# Patient Record
Sex: MALE
[De-identification: names, ages, dates, MRNs, and addresses within clinical notes are randomized per-mention and may not be internally consistent; named-entity substitution may affect disease eponyms.]

---

## 2020-07-08 ENCOUNTER — HOSPITAL ENCOUNTER (INPATIENT)
Dept: HOSPITAL 90 - EDH | Age: 60
LOS: 5 days | Discharge: NURSING, REHAB OR LONG-TERM CARE | DRG: 717 | End: 2020-07-13
Attending: INTERNAL MEDICINE | Admitting: INTERNAL MEDICINE
Payer: COMMERCIAL

## 2020-07-08 VITALS — BODY MASS INDEX: 26.22 KG/M2 | HEIGHT: 63 IN | WEIGHT: 148 LBS

## 2020-07-08 DIAGNOSIS — Z20.828: ICD-10-CM

## 2020-07-08 DIAGNOSIS — E11.9: ICD-10-CM

## 2020-07-08 DIAGNOSIS — I10: ICD-10-CM

## 2020-07-08 DIAGNOSIS — K21.9: ICD-10-CM

## 2020-07-08 DIAGNOSIS — B95.62: ICD-10-CM

## 2020-07-08 DIAGNOSIS — N49.2: Primary | ICD-10-CM

## 2020-07-08 DIAGNOSIS — F41.9: ICD-10-CM

## 2020-07-08 DIAGNOSIS — N39.0: ICD-10-CM

## 2020-07-08 DIAGNOSIS — F17.200: ICD-10-CM

## 2020-07-08 DIAGNOSIS — E78.5: ICD-10-CM

## 2020-07-08 RX ADMIN — VANCOMYCIN HYDROCHLORIDE SCH MLS/HR: 1 INJECTION, POWDER, LYOPHILIZED, FOR SOLUTION INTRAVENOUS at 23:00

## 2020-07-09 VITALS
DIASTOLIC BLOOD PRESSURE: 81 MMHG | RESPIRATION RATE: 18 BRPM | HEART RATE: 63 BPM | SYSTOLIC BLOOD PRESSURE: 114 MMHG | TEMPERATURE: 97.7 F

## 2020-07-09 VITALS
HEART RATE: 63 BPM | TEMPERATURE: 98.3 F | SYSTOLIC BLOOD PRESSURE: 104 MMHG | DIASTOLIC BLOOD PRESSURE: 67 MMHG | RESPIRATION RATE: 20 BRPM

## 2020-07-09 VITALS
HEART RATE: 62 BPM | TEMPERATURE: 98.1 F | SYSTOLIC BLOOD PRESSURE: 125 MMHG | RESPIRATION RATE: 20 BRPM | DIASTOLIC BLOOD PRESSURE: 73 MMHG

## 2020-07-09 VITALS
HEART RATE: 62 BPM | TEMPERATURE: 98 F | RESPIRATION RATE: 20 BRPM | SYSTOLIC BLOOD PRESSURE: 129 MMHG | DIASTOLIC BLOOD PRESSURE: 71 MMHG

## 2020-07-09 RX ADMIN — FAMOTIDINE SCH MG: 20 TABLET, FILM COATED ORAL at 20:57

## 2020-07-09 RX ADMIN — VANCOMYCIN HYDROCHLORIDE SCH MLS/HR: 1 INJECTION, POWDER, LYOPHILIZED, FOR SOLUTION INTRAVENOUS at 09:00

## 2020-07-09 RX ADMIN — SODIUM CHLORIDE SCH UNIT: 9 INJECTION, SOLUTION INTRAVENOUS at 11:30

## 2020-07-09 RX ADMIN — SODIUM CHLORIDE SCH UNIT: 9 INJECTION, SOLUTION INTRAVENOUS at 20:52

## 2020-07-09 RX ADMIN — SODIUM CHLORIDE SCH MLS/HR: 0.9 INJECTION, SOLUTION INTRAVENOUS at 16:15

## 2020-07-09 RX ADMIN — SODIUM CHLORIDE SCH UNIT: 9 INJECTION, SOLUTION INTRAVENOUS at 16:30

## 2020-07-09 RX ADMIN — FAMOTIDINE SCH MG: 20 TABLET, FILM COATED ORAL at 09:00

## 2020-07-09 RX ADMIN — SODIUM CHLORIDE SCH UNIT: 9 INJECTION, SOLUTION INTRAVENOUS at 07:30

## 2020-07-09 RX ADMIN — PIPERACILLIN SODIUM AND TAZOBACTAM SODIUM SCH MLS/HR: .375; 3 INJECTION, POWDER, LYOPHILIZED, FOR SOLUTION INTRAVENOUS at 20:59

## 2020-07-09 RX ADMIN — PIPERACILLIN SODIUM AND TAZOBACTAM SODIUM SCH MLS/HR: .375; 3 INJECTION, POWDER, LYOPHILIZED, FOR SOLUTION INTRAVENOUS at 05:00

## 2020-07-09 RX ADMIN — ENOXAPARIN SODIUM SCH MG: 40 INJECTION SUBCUTANEOUS at 09:00

## 2020-07-09 RX ADMIN — SODIUM CHLORIDE SCH MLS/HR: 0.9 INJECTION, SOLUTION INTRAVENOUS at 20:58

## 2020-07-09 RX ADMIN — VANCOMYCIN HYDROCHLORIDE SCH MLS/HR: 1 INJECTION, POWDER, LYOPHILIZED, FOR SOLUTION INTRAVENOUS at 20:53

## 2020-07-09 RX ADMIN — PIPERACILLIN SODIUM AND TAZOBACTAM SODIUM SCH MLS/HR: .375; 3 INJECTION, POWDER, LYOPHILIZED, FOR SOLUTION INTRAVENOUS at 13:00

## 2020-07-09 NOTE — NUR
SPOKE WITH SPOUSE ON PHONE- CALL TO PATIENT , 

NO ANSWER, CALL TO  SPOUSE ROBERT EID  LISTED ON FACE SHEET



STATES PATIENT LIVES WITH HER-  MANY YEARS AGO BUT NOW THEY LIVES TOGETHER, PT USES 
NO DME, HAS NOT PROVIDER OR HOME HEALTH, PATIENT IS FORGETFUL AND REQUIRES A BIT OF 
WATCHING, 



 DCP IS TO HOME, [facility] TO PROVIDE TRANSPORT , CM TO FOLLOW 

-------------------------------------------------------------------------------

Addendum: 07/09/20 at 1506 by FER TAYLOR RN CM

-------------------------------------------------------------------------------

Amended: Links added.

## 2020-07-09 NOTE — NUR
I spoke to Hunter FNP regarding patient's telemetry informed him patient has been NSR 
with BBB, no change since admission on the 6th. Orders received to go ahead and discontinue 
it. Patient made aware, verbalized understanding.

## 2020-07-09 NOTE — NUR
DR. JUAREZ  HERE , AND SPOKE WITH PT.OF  THE SURGERY FOR TOMMORROW. RISK  , EXPLAIN, AND  
ORDERS  TO FOLLOW

## 2020-07-10 VITALS
SYSTOLIC BLOOD PRESSURE: 115 MMHG | RESPIRATION RATE: 19 BRPM | DIASTOLIC BLOOD PRESSURE: 73 MMHG | HEART RATE: 61 BPM | TEMPERATURE: 97.4 F

## 2020-07-10 VITALS
DIASTOLIC BLOOD PRESSURE: 81 MMHG | SYSTOLIC BLOOD PRESSURE: 137 MMHG | TEMPERATURE: 98 F | HEART RATE: 64 BPM | RESPIRATION RATE: 16 BRPM

## 2020-07-10 VITALS
HEART RATE: 69 BPM | DIASTOLIC BLOOD PRESSURE: 70 MMHG | TEMPERATURE: 98 F | SYSTOLIC BLOOD PRESSURE: 110 MMHG | RESPIRATION RATE: 15 BRPM

## 2020-07-10 VITALS
DIASTOLIC BLOOD PRESSURE: 50 MMHG | RESPIRATION RATE: 18 BRPM | SYSTOLIC BLOOD PRESSURE: 100 MMHG | HEART RATE: 65 BPM | TEMPERATURE: 97.9 F

## 2020-07-10 VITALS
RESPIRATION RATE: 18 BRPM | TEMPERATURE: 97.4 F | SYSTOLIC BLOOD PRESSURE: 129 MMHG | DIASTOLIC BLOOD PRESSURE: 66 MMHG | HEART RATE: 62 BPM

## 2020-07-10 VITALS
TEMPERATURE: 97.4 F | DIASTOLIC BLOOD PRESSURE: 78 MMHG | SYSTOLIC BLOOD PRESSURE: 119 MMHG | RESPIRATION RATE: 18 BRPM | HEART RATE: 64 BPM

## 2020-07-10 VITALS
SYSTOLIC BLOOD PRESSURE: 125 MMHG | DIASTOLIC BLOOD PRESSURE: 75 MMHG | HEART RATE: 65 BPM | RESPIRATION RATE: 18 BRPM | TEMPERATURE: 97.4 F

## 2020-07-10 VITALS
DIASTOLIC BLOOD PRESSURE: 75 MMHG | RESPIRATION RATE: 16 BRPM | TEMPERATURE: 98 F | SYSTOLIC BLOOD PRESSURE: 125 MMHG | HEART RATE: 66 BPM

## 2020-07-10 VITALS
HEART RATE: 59 BPM | RESPIRATION RATE: 16 BRPM | DIASTOLIC BLOOD PRESSURE: 78 MMHG | TEMPERATURE: 98 F | SYSTOLIC BLOOD PRESSURE: 116 MMHG

## 2020-07-10 VITALS
SYSTOLIC BLOOD PRESSURE: 100 MMHG | RESPIRATION RATE: 20 BRPM | DIASTOLIC BLOOD PRESSURE: 64 MMHG | TEMPERATURE: 98.1 F | HEART RATE: 60 BPM

## 2020-07-10 VITALS
HEART RATE: 56 BPM | DIASTOLIC BLOOD PRESSURE: 75 MMHG | TEMPERATURE: 97.9 F | SYSTOLIC BLOOD PRESSURE: 117 MMHG | RESPIRATION RATE: 18 BRPM

## 2020-07-10 VITALS
SYSTOLIC BLOOD PRESSURE: 90 MMHG | DIASTOLIC BLOOD PRESSURE: 34 MMHG | HEART RATE: 98 BPM | TEMPERATURE: 98 F | RESPIRATION RATE: 17 BRPM

## 2020-07-10 VITALS — RESPIRATION RATE: 16 BRPM | HEART RATE: 73 BPM | SYSTOLIC BLOOD PRESSURE: 106 MMHG | DIASTOLIC BLOOD PRESSURE: 50 MMHG

## 2020-07-10 VITALS
DIASTOLIC BLOOD PRESSURE: 66 MMHG | HEART RATE: 68 BPM | RESPIRATION RATE: 16 BRPM | TEMPERATURE: 98 F | SYSTOLIC BLOOD PRESSURE: 101 MMHG

## 2020-07-10 VITALS
DIASTOLIC BLOOD PRESSURE: 87 MMHG | SYSTOLIC BLOOD PRESSURE: 127 MMHG | RESPIRATION RATE: 18 BRPM | HEART RATE: 58 BPM | TEMPERATURE: 97.4 F

## 2020-07-10 VITALS
SYSTOLIC BLOOD PRESSURE: 111 MMHG | DIASTOLIC BLOOD PRESSURE: 68 MMHG | TEMPERATURE: 97.8 F | RESPIRATION RATE: 16 BRPM | HEART RATE: 60 BPM

## 2020-07-10 VITALS
SYSTOLIC BLOOD PRESSURE: 118 MMHG | DIASTOLIC BLOOD PRESSURE: 90 MMHG | RESPIRATION RATE: 18 BRPM | TEMPERATURE: 98 F | HEART RATE: 66 BPM

## 2020-07-10 VITALS
HEART RATE: 64 BPM | TEMPERATURE: 98 F | SYSTOLIC BLOOD PRESSURE: 105 MMHG | DIASTOLIC BLOOD PRESSURE: 66 MMHG | RESPIRATION RATE: 12 BRPM

## 2020-07-10 VITALS
HEART RATE: 66 BPM | TEMPERATURE: 97.4 F | RESPIRATION RATE: 18 BRPM | SYSTOLIC BLOOD PRESSURE: 132 MMHG | DIASTOLIC BLOOD PRESSURE: 46 MMHG

## 2020-07-10 VITALS — SYSTOLIC BLOOD PRESSURE: 96 MMHG | RESPIRATION RATE: 18 BRPM | HEART RATE: 71 BPM | DIASTOLIC BLOOD PRESSURE: 45 MMHG

## 2020-07-10 VITALS
SYSTOLIC BLOOD PRESSURE: 102 MMHG | TEMPERATURE: 98.2 F | RESPIRATION RATE: 18 BRPM | DIASTOLIC BLOOD PRESSURE: 64 MMHG | HEART RATE: 63 BPM

## 2020-07-10 VITALS
TEMPERATURE: 97.4 F | HEART RATE: 61 BPM | SYSTOLIC BLOOD PRESSURE: 115 MMHG | RESPIRATION RATE: 18 BRPM | DIASTOLIC BLOOD PRESSURE: 73 MMHG

## 2020-07-10 VITALS — RESPIRATION RATE: 16 BRPM | DIASTOLIC BLOOD PRESSURE: 44 MMHG | SYSTOLIC BLOOD PRESSURE: 106 MMHG | HEART RATE: 65 BPM

## 2020-07-10 VITALS
DIASTOLIC BLOOD PRESSURE: 69 MMHG | RESPIRATION RATE: 16 BRPM | HEART RATE: 61 BPM | SYSTOLIC BLOOD PRESSURE: 109 MMHG | TEMPERATURE: 98 F

## 2020-07-10 VITALS
HEART RATE: 66 BPM | DIASTOLIC BLOOD PRESSURE: 88 MMHG | TEMPERATURE: 98 F | SYSTOLIC BLOOD PRESSURE: 136 MMHG | RESPIRATION RATE: 16 BRPM

## 2020-07-10 VITALS — SYSTOLIC BLOOD PRESSURE: 95 MMHG | RESPIRATION RATE: 15 BRPM | DIASTOLIC BLOOD PRESSURE: 51 MMHG | HEART RATE: 82 BPM

## 2020-07-10 VITALS
SYSTOLIC BLOOD PRESSURE: 105 MMHG | DIASTOLIC BLOOD PRESSURE: 67 MMHG | HEART RATE: 60 BPM | TEMPERATURE: 98 F | RESPIRATION RATE: 15 BRPM

## 2020-07-10 VITALS
SYSTOLIC BLOOD PRESSURE: 116 MMHG | TEMPERATURE: 97.4 F | DIASTOLIC BLOOD PRESSURE: 82 MMHG | HEART RATE: 58 BPM | RESPIRATION RATE: 18 BRPM

## 2020-07-10 VITALS — SYSTOLIC BLOOD PRESSURE: 99 MMHG | DIASTOLIC BLOOD PRESSURE: 48 MMHG | HEART RATE: 63 BPM | RESPIRATION RATE: 16 BRPM

## 2020-07-10 VITALS
RESPIRATION RATE: 18 BRPM | HEART RATE: 56 BPM | TEMPERATURE: 98.9 F | SYSTOLIC BLOOD PRESSURE: 104 MMHG | DIASTOLIC BLOOD PRESSURE: 59 MMHG

## 2020-07-10 VITALS — HEART RATE: 83 BPM | DIASTOLIC BLOOD PRESSURE: 42 MMHG | RESPIRATION RATE: 17 BRPM | SYSTOLIC BLOOD PRESSURE: 96 MMHG

## 2020-07-10 VITALS — DIASTOLIC BLOOD PRESSURE: 44 MMHG | HEART RATE: 76 BPM | RESPIRATION RATE: 15 BRPM | SYSTOLIC BLOOD PRESSURE: 92 MMHG

## 2020-07-10 PROCEDURE — 0VB50ZZ EXCISION OF SCROTUM, OPEN APPROACH: ICD-10-PCS | Performed by: UROLOGY

## 2020-07-10 RX ADMIN — FAMOTIDINE SCH MG: 20 TABLET, FILM COATED ORAL at 09:00

## 2020-07-10 RX ADMIN — ZOLPIDEM TARTRATE PRN MG: 5 TABLET, FILM COATED ORAL at 21:53

## 2020-07-10 RX ADMIN — ACETAMINOPHEN AND CODEINE PHOSPHATE PRN TAB: 300; 30 TABLET ORAL at 21:54

## 2020-07-10 RX ADMIN — VANCOMYCIN HYDROCHLORIDE SCH MLS/HR: 1 INJECTION, POWDER, LYOPHILIZED, FOR SOLUTION INTRAVENOUS at 21:36

## 2020-07-10 RX ADMIN — FAMOTIDINE SCH MG: 10 INJECTION INTRAVENOUS at 21:38

## 2020-07-10 RX ADMIN — PIPERACILLIN SODIUM AND TAZOBACTAM SODIUM SCH MLS/HR: .375; 3 INJECTION, POWDER, LYOPHILIZED, FOR SOLUTION INTRAVENOUS at 13:00

## 2020-07-10 RX ADMIN — ENOXAPARIN SODIUM SCH MG: 40 INJECTION SUBCUTANEOUS at 09:00

## 2020-07-10 RX ADMIN — VANCOMYCIN HYDROCHLORIDE SCH MLS/HR: 1 INJECTION, POWDER, LYOPHILIZED, FOR SOLUTION INTRAVENOUS at 08:29

## 2020-07-10 RX ADMIN — PIPERACILLIN SODIUM AND TAZOBACTAM SODIUM SCH MLS/HR: .375; 3 INJECTION, POWDER, LYOPHILIZED, FOR SOLUTION INTRAVENOUS at 05:00

## 2020-07-10 RX ADMIN — PIPERACILLIN SODIUM AND TAZOBACTAM SODIUM SCH MLS/HR: .375; 3 INJECTION, POWDER, LYOPHILIZED, FOR SOLUTION INTRAVENOUS at 21:36

## 2020-07-11 VITALS
HEART RATE: 64 BPM | RESPIRATION RATE: 20 BRPM | SYSTOLIC BLOOD PRESSURE: 126 MMHG | DIASTOLIC BLOOD PRESSURE: 71 MMHG | TEMPERATURE: 97.6 F

## 2020-07-11 VITALS
SYSTOLIC BLOOD PRESSURE: 112 MMHG | RESPIRATION RATE: 18 BRPM | DIASTOLIC BLOOD PRESSURE: 72 MMHG | TEMPERATURE: 97.7 F | HEART RATE: 58 BPM

## 2020-07-11 VITALS
HEART RATE: 57 BPM | DIASTOLIC BLOOD PRESSURE: 75 MMHG | SYSTOLIC BLOOD PRESSURE: 122 MMHG | RESPIRATION RATE: 16 BRPM | TEMPERATURE: 98.1 F

## 2020-07-11 VITALS
TEMPERATURE: 98.4 F | HEART RATE: 67 BPM | SYSTOLIC BLOOD PRESSURE: 133 MMHG | RESPIRATION RATE: 19 BRPM | DIASTOLIC BLOOD PRESSURE: 87 MMHG

## 2020-07-11 VITALS
TEMPERATURE: 98.2 F | HEART RATE: 60 BPM | RESPIRATION RATE: 16 BRPM | SYSTOLIC BLOOD PRESSURE: 119 MMHG | DIASTOLIC BLOOD PRESSURE: 81 MMHG

## 2020-07-11 VITALS
TEMPERATURE: 97.9 F | SYSTOLIC BLOOD PRESSURE: 99 MMHG | HEART RATE: 55 BPM | RESPIRATION RATE: 18 BRPM | DIASTOLIC BLOOD PRESSURE: 58 MMHG

## 2020-07-11 RX ADMIN — ENOXAPARIN SODIUM SCH MG: 40 INJECTION SUBCUTANEOUS at 09:44

## 2020-07-11 RX ADMIN — ZOLPIDEM TARTRATE PRN MG: 5 TABLET, FILM COATED ORAL at 21:24

## 2020-07-11 RX ADMIN — PIPERACILLIN SODIUM AND TAZOBACTAM SODIUM SCH MLS/HR: .375; 3 INJECTION, POWDER, LYOPHILIZED, FOR SOLUTION INTRAVENOUS at 04:16

## 2020-07-11 RX ADMIN — FAMOTIDINE SCH MG: 10 INJECTION INTRAVENOUS at 09:44

## 2020-07-11 RX ADMIN — VANCOMYCIN HYDROCHLORIDE SCH MLS/HR: 1 INJECTION, POWDER, LYOPHILIZED, FOR SOLUTION INTRAVENOUS at 09:43

## 2020-07-11 RX ADMIN — FAMOTIDINE SCH MG: 10 INJECTION INTRAVENOUS at 21:24

## 2020-07-11 RX ADMIN — PIPERACILLIN SODIUM AND TAZOBACTAM SODIUM SCH MLS/HR: .375; 3 INJECTION, POWDER, LYOPHILIZED, FOR SOLUTION INTRAVENOUS at 21:24

## 2020-07-11 RX ADMIN — VANCOMYCIN HYDROCHLORIDE SCH MLS/HR: 1 INJECTION, POWDER, LYOPHILIZED, FOR SOLUTION INTRAVENOUS at 21:24

## 2020-07-11 RX ADMIN — ACETAMINOPHEN AND CODEINE PHOSPHATE PRN TAB: 300; 30 TABLET ORAL at 09:45

## 2020-07-11 RX ADMIN — PIPERACILLIN SODIUM AND TAZOBACTAM SODIUM SCH MLS/HR: .375; 3 INJECTION, POWDER, LYOPHILIZED, FOR SOLUTION INTRAVENOUS at 14:42

## 2020-07-11 NOTE — NUR
MEDS

SHIFT ASSESSMENT DONE, PLEASE REFER TO CHART. DUE MEDS ADMINISTERED, TOLERATED WELL. CALL 
LIGHT WITHIN REACH. WILL MONITOR PT. 

-------------------------------------------------------------------------------

Addendum: 07/12/20 at 0021 by LETHA MICHELLE RN RN

-------------------------------------------------------------------------------

Amended: Links added.

## 2020-07-12 VITALS
HEART RATE: 60 BPM | DIASTOLIC BLOOD PRESSURE: 80 MMHG | SYSTOLIC BLOOD PRESSURE: 121 MMHG | TEMPERATURE: 96.7 F | RESPIRATION RATE: 18 BRPM

## 2020-07-12 VITALS
RESPIRATION RATE: 17 BRPM | DIASTOLIC BLOOD PRESSURE: 78 MMHG | HEART RATE: 65 BPM | TEMPERATURE: 98.2 F | SYSTOLIC BLOOD PRESSURE: 129 MMHG

## 2020-07-12 VITALS
DIASTOLIC BLOOD PRESSURE: 75 MMHG | SYSTOLIC BLOOD PRESSURE: 117 MMHG | HEART RATE: 53 BPM | TEMPERATURE: 97.5 F | RESPIRATION RATE: 18 BRPM

## 2020-07-12 VITALS
TEMPERATURE: 98.3 F | RESPIRATION RATE: 18 BRPM | SYSTOLIC BLOOD PRESSURE: 109 MMHG | HEART RATE: 54 BPM | DIASTOLIC BLOOD PRESSURE: 56 MMHG

## 2020-07-12 VITALS
TEMPERATURE: 97.9 F | DIASTOLIC BLOOD PRESSURE: 62 MMHG | RESPIRATION RATE: 18 BRPM | SYSTOLIC BLOOD PRESSURE: 105 MMHG | HEART RATE: 56 BPM

## 2020-07-12 VITALS
SYSTOLIC BLOOD PRESSURE: 116 MMHG | RESPIRATION RATE: 17 BRPM | HEART RATE: 77 BPM | TEMPERATURE: 98.4 F | DIASTOLIC BLOOD PRESSURE: 70 MMHG

## 2020-07-12 VITALS
RESPIRATION RATE: 18 BRPM | DIASTOLIC BLOOD PRESSURE: 63 MMHG | TEMPERATURE: 98.6 F | SYSTOLIC BLOOD PRESSURE: 117 MMHG | HEART RATE: 62 BPM

## 2020-07-12 RX ADMIN — ENOXAPARIN SODIUM SCH MG: 40 INJECTION SUBCUTANEOUS at 10:13

## 2020-07-12 RX ADMIN — FAMOTIDINE SCH MG: 10 INJECTION INTRAVENOUS at 20:21

## 2020-07-12 RX ADMIN — ZOLPIDEM TARTRATE PRN MG: 5 TABLET, FILM COATED ORAL at 22:23

## 2020-07-12 RX ADMIN — VANCOMYCIN HYDROCHLORIDE SCH MLS/HR: 1 INJECTION, POWDER, LYOPHILIZED, FOR SOLUTION INTRAVENOUS at 22:23

## 2020-07-12 RX ADMIN — FAMOTIDINE SCH MG: 10 INJECTION INTRAVENOUS at 10:12

## 2020-07-12 RX ADMIN — PIPERACILLIN SODIUM AND TAZOBACTAM SODIUM SCH MLS/HR: .375; 3 INJECTION, POWDER, LYOPHILIZED, FOR SOLUTION INTRAVENOUS at 04:35

## 2020-07-12 RX ADMIN — VANCOMYCIN HYDROCHLORIDE SCH MLS/HR: 1 INJECTION, POWDER, LYOPHILIZED, FOR SOLUTION INTRAVENOUS at 10:12

## 2020-07-12 RX ADMIN — ACETAMINOPHEN AND CODEINE PHOSPHATE PRN TAB: 300; 30 TABLET ORAL at 10:13

## 2020-07-12 NOTE — NUR
MEDS

PT AWAKENED WHEN WRITER ENTERS ROOM. DUE IV ANTIBIOTICS HUNG. ASSISTED PT TO BRUSH HIS 
TEETH.  KEPT COMFORTABLE. FOR MORE CARE.

## 2020-07-12 NOTE — NUR
MEDS

SHIFT ASSESSMENT DONE, PLEASE REFER TO CHART. DUE MEDS ADMINISTERED, TOLERATED WELL. KEPT 
RESTED AND COMFORTABLE. CALL LIGHT WITHIN REACH. WILL MONITOR PT. 

-------------------------------------------------------------------------------

Addendum: 07/13/20 at 0826 by LETHA MICHELLE RN RN

-------------------------------------------------------------------------------

Amended: Links added.

## 2020-07-12 NOTE — NUR
VANCO

VANCO THROUGH RESULTS = 10,  PRINTED AND FAXED TO PHARMACY FOR DOSING. DOSE ADMINISTERED AND 
WILL FOLLOW UP ON DOSING IN AM.

## 2020-07-12 NOTE — NUR
RIGHT ARM BASILIC VEIN ACCESSED, BUT AFTER MULTIPLE ATTEMPTS, UNABLE TO ADVANCE CATHETER TO 
SVC, KEPT TRAVELLING NORTH. CHEST XRAY ORDERED TO CONFIRM, BUT UNABLE TO ADVANCE TO SVC.  
RIGHT ARM PROCEDURE ABORTED.

LEFT ARM BASILIC VEIN WAS SUCCESSFULLY ACCESSED AND (+) [place] PER VPS INDICATES PICC 
CORRECTLY PLACED AND OK TO USE PER PROTOCOL. RN AWARE. 

BOTH PROCEDURES DONE USING ASEPTIC TECHNIQUE.

## 2020-07-13 VITALS
RESPIRATION RATE: 18 BRPM | TEMPERATURE: 97.8 F | SYSTOLIC BLOOD PRESSURE: 112 MMHG | DIASTOLIC BLOOD PRESSURE: 51 MMHG | HEART RATE: 57 BPM

## 2020-07-13 VITALS
TEMPERATURE: 98.4 F | HEART RATE: 64 BPM | DIASTOLIC BLOOD PRESSURE: 71 MMHG | RESPIRATION RATE: 19 BRPM | SYSTOLIC BLOOD PRESSURE: 120 MMHG

## 2020-07-13 VITALS
RESPIRATION RATE: 18 BRPM | HEART RATE: 53 BPM | TEMPERATURE: 98 F | DIASTOLIC BLOOD PRESSURE: 71 MMHG | SYSTOLIC BLOOD PRESSURE: 116 MMHG

## 2020-07-13 VITALS
DIASTOLIC BLOOD PRESSURE: 88 MMHG | SYSTOLIC BLOOD PRESSURE: 126 MMHG | TEMPERATURE: 98.3 F | HEART RATE: 60 BPM | RESPIRATION RATE: 18 BRPM

## 2020-07-13 RX ADMIN — FAMOTIDINE SCH MG: 10 INJECTION INTRAVENOUS at 10:50

## 2020-07-13 RX ADMIN — ENOXAPARIN SODIUM SCH MG: 40 INJECTION SUBCUTANEOUS at 10:52

## 2020-07-13 RX ADMIN — VANCOMYCIN HYDROCHLORIDE SCH MLS/HR: 1 INJECTION, POWDER, LYOPHILIZED, FOR SOLUTION INTRAVENOUS at 10:49

## 2020-07-13 NOTE — NUR
ROUNDS

PT RESTING WELL, FAIRLY ASLEEP WITH RESPIRATIONS EVEN AND UNLABORED. NO DISTRESS NOTED. KEPT 
RESTED AND COMFORTABLE. CALL LIGHT WITHIN REACH. WILL MONITOR PT.

## 2020-07-13 NOTE — NUR
ROUNDS

PT RESTING WELL, DENIES ANY NEEDS AT THIS TIME. NO DISTRESS NOTED. KEPT RESTED AND 
COMFORTABLE. FOR MORE CARE.

## 2020-07-13 NOTE — NUR
RAMON note: [facility]

CM spoke to Sharona dominguez/[facility], pt has approval. Pt safe to transfer via [facility] van 
once pt clear to [facility]. Primary nurse aware. CM to cont to follow up.

## 2020-07-13 NOTE — NUR
DC'D PT WITH GEO TO [facility] AS PER MD ORDER. PIV REMOVED. USING ASEPTIC TECHNIQUE 
REMOVED WOUND VAC PRIOR TO DC. APPLIED WET TO DRY DRESSING TO SCROTUM. PT DENIES ANY PAIN, 
IN NO APPARENT DISTRESS. REPORT GIVEN TO CHARLY HUNG AT [facility]. PT WHEELED DOWN IN 
WHEELCHAIR TO LOBBY BY CNA FOR .

## 2021-07-28 ENCOUNTER — HOSPITAL ENCOUNTER (OUTPATIENT)
Dept: HOSPITAL 90 - RAH | Age: 61
Discharge: HOME | End: 2021-07-28
Attending: INTERNAL MEDICINE
Payer: COMMERCIAL

## 2021-07-28 DIAGNOSIS — N50.3: ICD-10-CM

## 2021-07-28 DIAGNOSIS — N49.2: Primary | ICD-10-CM

## 2021-07-28 PROCEDURE — 76870 US EXAM SCROTUM: CPT
